# Patient Record
Sex: MALE | Race: WHITE | NOT HISPANIC OR LATINO | Employment: FULL TIME | URBAN - METROPOLITAN AREA
[De-identification: names, ages, dates, MRNs, and addresses within clinical notes are randomized per-mention and may not be internally consistent; named-entity substitution may affect disease eponyms.]

---

## 2020-04-15 ENCOUNTER — TELEMEDICINE CONVERTED (OUTPATIENT)
Dept: GASTROENTEROLOGY | Facility: CLINIC | Age: 25
End: 2020-04-15
Attending: NURSE PRACTITIONER

## 2020-04-15 ENCOUNTER — CONVERSION ENCOUNTER (OUTPATIENT)
Dept: OTHER | Facility: HOSPITAL | Age: 25
End: 2020-04-15

## 2020-06-26 ENCOUNTER — HOSPITAL ENCOUNTER (OUTPATIENT)
Dept: GASTROENTEROLOGY | Facility: HOSPITAL | Age: 25
Setting detail: HOSPITAL OUTPATIENT SURGERY
Discharge: HOME | End: 2020-06-26
Attending: INTERNAL MEDICINE

## 2020-06-26 LAB — SARS-COV-2 RNA SPEC QL NAA+PROBE: NOT DETECTED

## 2021-05-10 NOTE — H&P
History and Physical      Patient Name: Flaquito Cox   Patient ID: [id number]   Sex: Male   YOB: 1995    Primary Care Provider: Alysia Sylvester   Referring Provider: Alysia Sylvester    Visit Date: April 15, 2020    Provider: SOHEILA Camilo   Location: [facility]   Location Address: [address] [place] [address], [address]  [place], [place]  [id number]   Location Phone: [phone]          Chief Complaint  · Dysphagia      History Of Present Illness  The patient is a 24 year old male who presents on referral from Alysia Sylvester for a gastroenterology evaluation.      He presents for evaluation of dysphagia.  He reports that symptoms began last year.  He was evaluated and underwent EGD per Dr. Irene and underwent an esophageal dilation.    8/14/2019 CBC: Hemoglobin 13.9, hematocrit 40.1.  CMP: Alk phos 60, AST 16, ALT 8, total bilirubin 1.  10/8/2019 EGD-narrowing of distal esophagus without evidence of esophagitis.  2+ gastritis.  Gastric antrum biopsy-mild chronic gastritis with reactive epithelial changes.  Distal esophagus biopsy-reflux esophagitis, chronic focally active gastritis.      He admits dysphagia in mid esophagus.  Worse with bread and steak.  He is able to get food to go down with drinking.  Sometimes has to vomit to dislodge food.   He states that after previous EGD, symptoms were resolved for about 2 months and then returned.             Past Medical History  ***No Significant Medical History         Past Surgical History  EGD; Esophageal Dilation; Hudson Tooth Extraction         Allergy List  Neosporin       Allergies Reconciled  Family Medical History  *No Pertinent Past Medical History         Social History  Alcohol (Never); Tobacco (Never)         Review of Systems  · Constitutional  o Denies  o : chills, fever  · Eyes  o Denies  o : blurred vision, changes in vision  · Cardiovascular  o Denies  o : chest pain, irregular heart beats  · Respiratory  o Denies  o :  "shortness of breath, cough  · Gastrointestinal  o Admits  o : See HPI  · Genitourinary  o Denies  o : dysuria, blood in urine  · Integument  o Denies  o : rash, new skin lesions  · Neurologic  o Denies  o : tingling or numbness, seizures  · Musculoskeletal  o Denies  o : joint pain, joint swelling  · Endocrine  o Denies  o : weight gain, weight loss  · Psychiatric  o Denies  o : anxiety, depression      Vitals  Date Time BP Position Site L\R Cuff Size HR RR TEMP (F) WT  HT  BMI kg/m2 BSA m2 O2 Sat HC       04/15/2020 03:02 PM         155lbs 0oz 6'  5\" 18.38 1.95           Physical Examination  · Constitutional  o Appearance  o : well developed, well-nourished, in no acute distress  · Eyes  o Vision  o :   § Visual Fields  § : eyes move symmetrical in all directions  o Sclerae  o : anicteric  o Pupils and Irises  o : pupils equal and symmetrical  · Respiratory  o Respiratory Effort  o : breathing unlabored  · Psychiatric  o General  o : Alert and oriented x3  o Mood and Affect  o : Mood and affect are appropriate to circumstances          Assessment  · Esophageal dysphagia     787.20/R13.10    Problems Reconciled  Plan  · Orders  o Consent for Esophagogastroduodenoscopy (EGD) - Possible risks/complications, benefits, and alternatives to surgical or invasive procedure have been explained to patient and/or legal guardian. - Patient has been evaluated and can tolerate anesthesia and/or sedation. Risks, benefits, and alternatives to anesthesia and sedation have been explained to patient and/or legal guardian. (66111) - - 04/15/2020  · Medications  o Prevacid SoluTab 30 mg oral tablet,disintegrat, delay rel   SIG: take 1 tablet (30 mg) and place on top of the tongue where it will dissolve, then swallow by oral route once daily   DISP: (30) tablets with 3 refills  Prescribed on 04/15/2020     o Medications have been Reconciled  · Instructions  o Handouts provided: Pre-procedure instructions including date and time and " location of procedure.  o PLAN: Proceed with procedure. Patient understands risks and benefits and is willing to proceed. Understands the risks include, but are not limited to, bleeding and/or perforation.  o Information given on current diagnoses.  o Electronically Identified Patient Education Materials Provided Electronically  · Disposition  o Follow Up after procedure            Electronically Signed by: SOHEILA Camilo -Author on April 15, 2020 05:08:22 PM

## 2021-05-15 VITALS — BODY MASS INDEX: 18.3 KG/M2 | HEIGHT: 77 IN | WEIGHT: 155 LBS

## 2021-10-15 ENCOUNTER — OFFICE VISIT (OUTPATIENT)
Dept: ORTHOPEDIC SURGERY | Facility: CLINIC | Age: 26
End: 2021-10-15

## 2021-10-15 VITALS — WEIGHT: 160 LBS | BODY MASS INDEX: 18.89 KG/M2 | HEIGHT: 77 IN | TEMPERATURE: 97.6 F

## 2021-10-15 DIAGNOSIS — S76.011A MUSCLE STRAIN OF RIGHT GLUTEAL REGION, INITIAL ENCOUNTER: Primary | ICD-10-CM

## 2021-10-15 DIAGNOSIS — M25.551 RIGHT HIP PAIN: ICD-10-CM

## 2021-10-15 DIAGNOSIS — M54.50 ACUTE LOW BACK PAIN, UNSPECIFIED BACK PAIN LATERALITY, UNSPECIFIED WHETHER SCIATICA PRESENT: ICD-10-CM

## 2021-10-15 PROCEDURE — 99204 OFFICE O/P NEW MOD 45 MIN: CPT | Performed by: PHYSICIAN ASSISTANT

## 2021-10-15 RX ORDER — CYCLOBENZAPRINE HCL 5 MG
TABLET ORAL
COMMUNITY
Start: 2021-09-29

## 2021-11-01 PROBLEM — M25.551 RIGHT HIP PAIN: Status: ACTIVE | Noted: 2021-11-01

## 2021-11-01 PROBLEM — S76.011A MUSCLE STRAIN OF RIGHT GLUTEAL REGION: Status: ACTIVE | Noted: 2021-11-01

## 2021-11-01 PROBLEM — M54.50 ACUTE LOW BACK PAIN: Status: ACTIVE | Noted: 2021-11-01

## 2023-09-09 ENCOUNTER — APPOINTMENT (OUTPATIENT)
Dept: CT IMAGING | Facility: HOSPITAL | Age: 28
End: 2023-09-09
Payer: COMMERCIAL

## 2023-09-09 ENCOUNTER — APPOINTMENT (OUTPATIENT)
Dept: ULTRASOUND IMAGING | Facility: HOSPITAL | Age: 28
End: 2023-09-09
Payer: COMMERCIAL

## 2023-09-09 ENCOUNTER — HOSPITAL ENCOUNTER (EMERGENCY)
Facility: HOSPITAL | Age: 28
Discharge: HOME | End: 2023-09-10
Attending: EMERGENCY MEDICINE
Payer: COMMERCIAL

## 2023-09-09 VITALS
BODY MASS INDEX: 20.1 KG/M2 | DIASTOLIC BLOOD PRESSURE: 67 MMHG | WEIGHT: 170.19 LBS | TEMPERATURE: 98.1 F | SYSTOLIC BLOOD PRESSURE: 107 MMHG | OXYGEN SATURATION: 96 % | RESPIRATION RATE: 18 BRPM | HEIGHT: 77 IN | HEART RATE: 64 BPM

## 2023-09-09 DIAGNOSIS — R10.9 ABDOMINAL PAIN, UNSPECIFIED ABDOMINAL LOCATION: Primary | ICD-10-CM

## 2023-09-09 LAB
ALBUMIN SERPL-MCNC: 4.7 G/DL (ref 3.5–5.2)
ALBUMIN/GLOB SERPL: 2 G/DL
ALP SERPL-CCNC: 75 U/L (ref 39–117)
ALT SERPL W P-5'-P-CCNC: 9 U/L (ref 1–41)
ANION GAP SERPL CALCULATED.3IONS-SCNC: 8.8 MMOL/L (ref 5–15)
AST SERPL-CCNC: 14 U/L (ref 1–40)
BASOPHILS # BLD AUTO: 0.08 10*3/MM3 (ref 0–0.2)
BASOPHILS NFR BLD AUTO: 1.3 % (ref 0–1.5)
BILIRUB SERPL-MCNC: 0.7 MG/DL (ref 0–1.2)
BILIRUB UR QL STRIP: NEGATIVE
BUN SERPL-MCNC: 8 MG/DL (ref 6–20)
BUN/CREAT SERPL: 7.9 (ref 7–25)
CALCIUM SPEC-SCNC: 9.6 MG/DL (ref 8.6–10.5)
CHLORIDE SERPL-SCNC: 103 MMOL/L (ref 98–107)
CLARITY UR: CLEAR
CO2 SERPL-SCNC: 30.2 MMOL/L (ref 22–29)
COLOR UR: YELLOW
CREAT SERPL-MCNC: 1.01 MG/DL (ref 0.76–1.27)
DEPRECATED RDW RBC AUTO: 39.4 FL (ref 37–54)
EGFRCR SERPLBLD CKD-EPI 2021: 104.5 ML/MIN/1.73
EOSINOPHIL # BLD AUTO: 0.27 10*3/MM3 (ref 0–0.4)
EOSINOPHIL NFR BLD AUTO: 4.3 % (ref 0.3–6.2)
ERYTHROCYTE [DISTWIDTH] IN BLOOD BY AUTOMATED COUNT: 12.8 % (ref 12.3–15.4)
GLOBULIN UR ELPH-MCNC: 2.3 GM/DL
GLUCOSE SERPL-MCNC: 93 MG/DL (ref 65–99)
GLUCOSE UR STRIP-MCNC: NEGATIVE MG/DL
HCT VFR BLD AUTO: 41.4 % (ref 37.5–51)
HGB BLD-MCNC: 14.4 G/DL (ref 13–17.7)
HGB UR QL STRIP.AUTO: NEGATIVE
HOLD SPECIMEN: NORMAL
HOLD SPECIMEN: NORMAL
IMM GRANULOCYTES # BLD AUTO: 0.01 10*3/MM3 (ref 0–0.05)
IMM GRANULOCYTES NFR BLD AUTO: 0.2 % (ref 0–0.5)
KETONES UR QL STRIP: NEGATIVE
LEUKOCYTE ESTERASE UR QL STRIP.AUTO: NEGATIVE
LIPASE SERPL-CCNC: 26 U/L (ref 13–60)
LYMPHOCYTES # BLD AUTO: 2.8 10*3/MM3 (ref 0.7–3.1)
LYMPHOCYTES NFR BLD AUTO: 44.2 % (ref 19.6–45.3)
MCH RBC QN AUTO: 29.8 PG (ref 26.6–33)
MCHC RBC AUTO-ENTMCNC: 34.8 G/DL (ref 31.5–35.7)
MCV RBC AUTO: 85.5 FL (ref 79–97)
MONOCYTES # BLD AUTO: 0.53 10*3/MM3 (ref 0.1–0.9)
MONOCYTES NFR BLD AUTO: 8.4 % (ref 5–12)
NEUTROPHILS NFR BLD AUTO: 2.64 10*3/MM3 (ref 1.7–7)
NEUTROPHILS NFR BLD AUTO: 41.6 % (ref 42.7–76)
NITRITE UR QL STRIP: NEGATIVE
NRBC BLD AUTO-RTO: 0 /100 WBC (ref 0–0.2)
PH UR STRIP.AUTO: 6.5 [PH] (ref 5–8)
PLATELET # BLD AUTO: 145 10*3/MM3 (ref 140–450)
PMV BLD AUTO: 9.5 FL (ref 6–12)
POTASSIUM SERPL-SCNC: 4 MMOL/L (ref 3.5–5.2)
PROT SERPL-MCNC: 7 G/DL (ref 6–8.5)
PROT UR QL STRIP: NEGATIVE
RBC # BLD AUTO: 4.84 10*6/MM3 (ref 4.14–5.8)
SODIUM SERPL-SCNC: 142 MMOL/L (ref 136–145)
SP GR UR STRIP: 1.01 (ref 1–1.03)
UROBILINOGEN UR QL STRIP: NORMAL
WBC NRBC COR # BLD: 6.33 10*3/MM3 (ref 3.4–10.8)
WHOLE BLOOD HOLD COAG: NORMAL
WHOLE BLOOD HOLD SPECIMEN: NORMAL

## 2023-09-09 PROCEDURE — 99285 EMERGENCY DEPT VISIT HI MDM: CPT

## 2023-09-09 PROCEDURE — 83690 ASSAY OF LIPASE: CPT

## 2023-09-09 PROCEDURE — 76870 US EXAM SCROTUM: CPT

## 2023-09-09 PROCEDURE — 85025 COMPLETE CBC W/AUTO DIFF WBC: CPT

## 2023-09-09 PROCEDURE — 80053 COMPREHEN METABOLIC PANEL: CPT

## 2023-09-09 PROCEDURE — 74177 CT ABD & PELVIS W/CONTRAST: CPT

## 2023-09-09 PROCEDURE — 25510000001 IOPAMIDOL PER 1 ML: Performed by: EMERGENCY MEDICINE

## 2023-09-09 PROCEDURE — 81003 URINALYSIS AUTO W/O SCOPE: CPT

## 2023-09-09 RX ORDER — SODIUM CHLORIDE 0.9 % (FLUSH) 0.9 %
10 SYRINGE (ML) INJECTION AS NEEDED
Status: DISCONTINUED | OUTPATIENT
Start: 2023-09-09 | End: 2023-09-10 | Stop reason: HOSPADM

## 2023-09-09 RX ORDER — OMEPRAZOLE 20 MG/1
20 CAPSULE, DELAYED RELEASE ORAL DAILY
COMMUNITY
Start: 2023-08-25

## 2023-09-09 RX ADMIN — IOPAMIDOL 100 ML: 755 INJECTION, SOLUTION INTRAVENOUS at 22:00

## 2023-09-10 NOTE — ED PROVIDER NOTES
Time: 11:39 PM EDT  Date of encounter:  9/9/2023  Independent Historian/Clinical History and Information was obtained by:   Patient    History is limited by: N/A    Chief Complaint: Abdominal/groin pain      History of Present Illness:  Patient is a 28 y.o. year old male who presents to the emergency department for evaluation of abdominal/groin pain that began several days ago.  Patient denies trauma or injury to the location.  Patient states he works as a  and strains and lifts all the time.  Patient denies dysuria, fever, nausea/vomiting.    HPI    Patient Care Team  Primary Care Provider: Alysia Salcedo APRN    Past Medical History:     Allergies   Allergen Reactions    Cholestatin Itching     No past medical history on file.  Past Surgical History:   Procedure Laterality Date    THROAT SURGERY       No family history on file.    Home Medications:  Prior to Admission medications    Medication Sig Start Date End Date Taking? Authorizing Provider   omeprazole (priLOSEC) 20 MG capsule Take 1 capsule by mouth Daily. 8/25/23  Yes ProviderEli MD   cyclobenzaprine (FLEXERIL) 5 MG tablet  9/29/21   Provider, MD Eli        Social History:   Social History     Tobacco Use    Smoking status: Never    Smokeless tobacco: Never         Review of Systems:  Review of Systems   Constitutional:  Negative for chills and fever.   HENT:  Negative for congestion, rhinorrhea and sore throat.    Eyes:  Negative for pain and visual disturbance.   Respiratory:  Negative for apnea, cough, chest tightness and shortness of breath.    Cardiovascular:  Negative for chest pain and palpitations.   Gastrointestinal:  Positive for abdominal pain. Negative for diarrhea, nausea and vomiting.   Genitourinary:  Negative for difficulty urinating and dysuria.   Musculoskeletal:  Negative for joint swelling and myalgias.   Skin:  Negative for color change.   Neurological:  Negative for seizures and headaches.  "  Psychiatric/Behavioral: Negative.     All other systems reviewed and are negative.     Physical Exam:  /67   Pulse 64   Temp 98.1 °F (36.7 °C) (Oral)   Resp 18   Ht 195.6 cm (77.01\")   Wt 77.2 kg (170 lb 3.1 oz)   SpO2 96%   BMI 20.18 kg/m²     Physical Exam  Vitals and nursing note reviewed.   Constitutional:       General: He is not in acute distress.     Appearance: Normal appearance. He is not toxic-appearing.   HENT:      Head: Normocephalic and atraumatic.      Jaw: There is normal jaw occlusion.   Eyes:      General: Lids are normal.      Extraocular Movements: Extraocular movements intact.      Conjunctiva/sclera: Conjunctivae normal.      Pupils: Pupils are equal, round, and reactive to light.   Cardiovascular:      Rate and Rhythm: Normal rate and regular rhythm.      Pulses: Normal pulses.      Heart sounds: Normal heart sounds.   Pulmonary:      Effort: Pulmonary effort is normal. No respiratory distress.      Breath sounds: Normal breath sounds. No wheezing or rhonchi.   Abdominal:      General: Abdomen is flat.      Palpations: Abdomen is soft.      Tenderness: There is abdominal tenderness in the suprapubic area. There is no guarding or rebound.   Musculoskeletal:         General: Normal range of motion.      Cervical back: Normal range of motion and neck supple.      Right lower leg: No edema.      Left lower leg: No edema.   Skin:     General: Skin is warm and dry.   Neurological:      Mental Status: He is alert and oriented to person, place, and time. Mental status is at baseline.   Psychiatric:         Mood and Affect: Mood normal.                Procedures:  Procedures      Medical Decision Making:      Comorbidities that affect care:    None    External Notes reviewed:          The following orders were placed and all results were independently analyzed by me:  Orders Placed This Encounter   Procedures    US Scrotum & Testicles    CT Abdomen Pelvis With Contrast    [place] Draw    " Comprehensive Metabolic Panel    Lipase    Urinalysis With Culture If Indicated -    CBC Auto Differential    Insert Peripheral IV    CBC & Differential    Green Top (Gel)    Lavender Top    Gold Top - SST    Light Blue Top       Medications Given in the Emergency Department:  Medications   sodium chloride 0.9 % flush 10 mL (has no administration in time range)   iopamidol (ISOVUE-370) 76 % injection 100 mL (100 mL Intravenous Given 9/9/23 2200)        ED Course:         Labs:    Lab Results (last 24 hours)       Procedure Component Value Units Date/Time    CBC & Differential [[id number]]  (Abnormal) Collected: 09/09/23 2051    Specimen: Blood Updated: 09/09/23 2057    Narrative:      The following orders were created for panel order CBC & Differential.  Procedure                               Abnormality         Status                     ---------                               -----------         ------                     CBC Auto Differential[[id number]]        Abnormal            Final result                 Please view results for these tests on the individual orders.    Comprehensive Metabolic Panel [[id number]]  (Abnormal) Collected: 09/09/23 2051    Specimen: Blood Updated: 09/09/23 2121     Glucose 93 mg/dL      BUN 8 mg/dL      Creatinine 1.01 mg/dL      Sodium 142 mmol/L      Potassium 4.0 mmol/L      Chloride 103 mmol/L      CO2 30.2 mmol/L      Calcium 9.6 mg/dL      Total Protein 7.0 g/dL      Albumin 4.7 g/dL      ALT (SGPT) 9 U/L      AST (SGOT) 14 U/L      Alkaline Phosphatase 75 U/L      Total Bilirubin 0.7 mg/dL      Globulin 2.3 gm/dL      A/G Ratio 2.0 g/dL      BUN/Creatinine Ratio 7.9     Anion Gap 8.8 mmol/L      eGFR 104.5 mL/min/1.73     Narrative:      GFR Normal >60  Chronic Kidney Disease <60  Kidney Failure <15      Lipase [[id number]]  (Normal) Collected: 09/09/23 2051    Specimen: Blood Updated: 09/09/23 2121     Lipase 26 U/L     CBC Auto Differential [[id number]]  (Abnormal) Collected:  09/09/23 2051    Specimen: Blood Updated: 09/09/23 2057     WBC 6.33 10*3/mm3      RBC 4.84 10*6/mm3      Hemoglobin 14.4 g/dL      Hematocrit 41.4 %      MCV 85.5 fL      MCH 29.8 pg      MCHC 34.8 g/dL      RDW 12.8 %      RDW-SD 39.4 fl      MPV 9.5 fL      Platelets 145 10*3/mm3      Neutrophil % 41.6 %      Lymphocyte % 44.2 %      Monocyte % 8.4 %      Eosinophil % 4.3 %      Basophil % 1.3 %      Immature Grans % 0.2 %      Neutrophils, Absolute 2.64 10*3/mm3      Lymphocytes, Absolute 2.80 10*3/mm3      Monocytes, Absolute 0.53 10*3/mm3      Eosinophils, Absolute 0.27 10*3/mm3      Basophils, Absolute 0.08 10*3/mm3      Immature Grans, Absolute 0.01 10*3/mm3      nRBC 0.0 /100 WBC     Urinalysis With Culture If Indicated - Urine, Clean Catch [[id number]]  (Normal) Collected: 09/09/23 2056    Specimen: Urine, Clean Catch Updated: 09/09/23 2119     Color, UA Yellow     Appearance, UA Clear     pH, UA 6.5     Specific Gravity, UA 1.008     Glucose, UA Negative     Ketones, UA Negative     Bilirubin, UA Negative     Blood, UA Negative     Protein, UA Negative     Leuk Esterase, UA Negative     Nitrite, UA Negative     Urobilinogen, UA 1.0 E.U./dL    Narrative:      In absence of clinical symptoms, the presence of pyuria, bacteria, and/or nitrites on the urinalysis result does not correlate with infection.  Urine microscopic not indicated.             Imaging:    US Scrotum & Testicles    Result Date: 9/9/2023  PROCEDURE: US SCROTUM AND TESTICLES  COMPARISON: None  INDICATIONS: pain  TECHNIQUE: The scrotum and testicles were evaluated with gray scale and color duplex Doppler sonography.   FINDINGS:  The right testicle measures 2.5 x 1.2 x 1.6 cm. No focal right testicular abnormalities are seen. Flow is observed within the right testicle on Doppler evaluation.  The left testicle measures 2.1 x 1.3 x 1.8 cm. No focal left testicular abnormalities are seen. Flow is observed within the left testicle on Doppler  evaluation.  An incidental cyst or spermatocele is noted involving the right epididymis measuring 4 mm.  The right epididymis is otherwise unremarkable.  The left epididymis is not well visualized.  There is no evidence for hydrocele. There is no evidence for varicocele. There is no evidence for hernia. The scrotal wall is unremarkable without significant thickening or edema.        1. Unremarkable ultrasound of the bilateral testicles. Flow is seen within the bilateral testicles on Doppler evaluation. 2. The left epididymis is not well visualized.       MERLINE CRENSHAW MD       Electronically Signed and Approved By: MERLINE CRENSHAW MD on 9/09/2023 at 23:35             CT Abdomen Pelvis With Contrast    Result Date: 9/9/2023  PROCEDURE: CT ABDOMEN PELVIS W CONTRAST  COMPARISON: None  INDICATIONS: LEFT LOWER QUADRANT/ GROIN PAIN  TECHNIQUE: After obtaining the patient's consent, CT images were created with non-ionic intravenous contrast material.   PROTOCOL:   Standard imaging protocol performed    RADIATION:   DLP: 376.3mGy*cm   Automated exposure control was utilized to minimize radiation dose. CONTRAST: 90cc Isovue 370 I.V. LABS:   eGFR: >60ml/min/1.73m2  FINDINGS:  No consolidations or pleural effusions are seen involving the lung bases.  The liver, spleen, and pancreas are unremarkable. The gallbladder and biliary tree are unremarkable. The bilateral adrenal glands are symmetric and unremarkable. The bilateral kidneys are symmetric and unremarkable.  No significant bowel dilatation or obstruction is seen. A normal-appearing air-filled appendix is observed. There is no evidence for acute appendicitis. No abnormal fluid collections are seen. No significant free fluid is observed. No significant lymphadenopathy is seen throughout the abdomen or pelvis. The bladder is decompressed. The celiac and superior mesenteric arterial distributions are well opacified without evidence for occlusion.  No acute osseous  abnormalities are seen.        1. No evidence for acute abnormality throughout the abdomen or pelvis.     MERLINE CRENSHAW MD       Electronically Signed and Approved By: MERLINE CRENSHAW MD on 9/09/2023 at 22:24                Differential Diagnosis and Discussion:    Abdominal Pain: Based on the patient's signs and symptoms, I considered abdominal aortic aneurysm, small bowel obstruction, pancreatitis, acute cholecystitis, acute appendecitis, peptic ulcer disease, gastritis, colitis, endocrine disorders, irritable bowel syndrome and other differential diagnosis an etiology of the patient's abdominal pain.    All labs were reviewed and interpreted by me.  CT scan radiology impression was interpreted by me.  Ultrasound impression was interpreted by me.     MDM     CT abdomen pelvis with contrast as well as ultrasound scrotum and testicles are unremarkable.  The patient´s CBC that was reviewed and interpreted by me shows no abnormalities of critical concern. Of note, there is no anemia requiring a blood transfusion and the platelet count is acceptable.  The patient´s CMP that was reviewed and interpretted by me shows no abnormalities of critical concern. Of note, the patient´s sodium and potassium are acceptable. The patient´s liver enzymes are unremarkable. The patient´s renal function (creatinine) is preserved. The patient has a normal anion gap.  Patient states he is not have any abdominal pain and denies dysuria.  Patient denies flank pain.  Instructed the patient to return to the ER if he developed intractable nausea/vomiting, abdominal pain, or fever.      Patient Care Considerations:          Consultants/Shared Management Plan:    None    Social Determinants of Health:    Patient is independent, reliable, and has access to care.       Disposition and Care Coordination:    Discharged: I considered escalation of care by admitting this patient to the hospital, however the patient has improved and is suitable and  stable for discharge.    I have explained the patient´s condition, diagnoses and treatment plan based on the information available to me at this time. I have answered questions and addressed any concerns. The patient has a good  understanding of the patient´s diagnosis, condition, and treatment plan as can be expected at this point. The vital signs have been stable. The patient´s condition is stable and appropriate for discharge from the emergency department.      The patient will pursue further outpatient evaluation with the primary care physician or other designated or consulting physician as outlined in the discharge instructions. They are agreeable to this plan of care and follow-up instructions have been explained in detail. The patient has received these instructions in written format and have expressed an understanding of the discharge instructions. The patient is aware that any significant change in condition or worsening of symptoms should prompt an immediate return to this or the closest emergency department or call to 1.  I have explained discharge medications and the need for follow up with the patient/caretakers. This was also printed in the discharge instructions. Patient was discharged with the following medications and follow up:      Medication List      No changes were made to your prescriptions during this visit.      Alysia Salcedo APRN  [address] [place] [address]  [phone]    Call in 2 days  As needed       Final diagnoses:   Abdominal pain, unspecified abdominal location        ED Disposition       ED Disposition   Discharge    Condition   Stable    Comment   --               This medical record created using voice recognition software.             Franco Gray PA-C  09/09/23 [id number]

## 2024-04-15 ENCOUNTER — TELEPHONE (OUTPATIENT)
Dept: GASTROENTEROLOGY | Facility: CLINIC | Age: 29
End: 2024-04-15

## 2024-04-15 NOTE — TELEPHONE ENCOUNTER
Contacted Flaquito Cox 1995 regarding the appointment no show with SOHEILA Charlton on 4/15/24 @ 2:15 pm  Patient is aware that there is a 24-hour cancellation policy and understands that a no-show letter will be mailed to them at the address on file.The patient has rescheduled 9/23/2024. Added to waitlist

## 2024-08-19 ENCOUNTER — TELEPHONE (OUTPATIENT)
Dept: GASTROENTEROLOGY | Facility: CLINIC | Age: 29
End: 2024-08-19

## 2024-08-19 NOTE — TELEPHONE ENCOUNTER
Attempted to contact pt to r/s appt due to change in providers schedule. Left a vm requesting a return call.

## 2024-08-21 NOTE — TELEPHONE ENCOUNTER
Attempted to contact patient for the second time, it appears that patient was with [facility]. Left a message for patient to contact the office back. Due to this appointment needing to be changed.